# Patient Record
Sex: FEMALE | Race: WHITE | NOT HISPANIC OR LATINO | URBAN - METROPOLITAN AREA
[De-identification: names, ages, dates, MRNs, and addresses within clinical notes are randomized per-mention and may not be internally consistent; named-entity substitution may affect disease eponyms.]

---

## 2019-07-04 ENCOUNTER — EMERGENCY (EMERGENCY)
Facility: HOSPITAL | Age: 69
LOS: 1 days | Discharge: HOME | End: 2019-07-04
Attending: EMERGENCY MEDICINE
Payer: MEDICARE

## 2019-07-04 VITALS
HEART RATE: 78 BPM | DIASTOLIC BLOOD PRESSURE: 91 MMHG | RESPIRATION RATE: 18 BRPM | SYSTOLIC BLOOD PRESSURE: 150 MMHG | OXYGEN SATURATION: 100 %

## 2019-07-04 VITALS
RESPIRATION RATE: 20 BRPM | HEIGHT: 65 IN | HEART RATE: 79 BPM | WEIGHT: 126.1 LBS | DIASTOLIC BLOOD PRESSURE: 82 MMHG | TEMPERATURE: 98 F | SYSTOLIC BLOOD PRESSURE: 160 MMHG | OXYGEN SATURATION: 98 %

## 2019-07-04 LAB
ALBUMIN SERPL ELPH-MCNC: 4.2 G/DL — SIGNIFICANT CHANGE UP (ref 3.3–5)
ALP SERPL-CCNC: 52 U/L — SIGNIFICANT CHANGE UP (ref 40–120)
ALT FLD-CCNC: 12 U/L — SIGNIFICANT CHANGE UP (ref 10–45)
ANION GAP SERPL CALC-SCNC: 13 MMOL/L — SIGNIFICANT CHANGE UP (ref 5–17)
AST SERPL-CCNC: 14 U/L — SIGNIFICANT CHANGE UP (ref 10–40)
BASOPHILS # BLD AUTO: 0 K/UL — SIGNIFICANT CHANGE UP (ref 0–0.2)
BASOPHILS NFR BLD AUTO: 0.1 % — SIGNIFICANT CHANGE UP (ref 0–2)
BILIRUB SERPL-MCNC: 0.8 MG/DL — SIGNIFICANT CHANGE UP (ref 0.2–1.2)
BUN SERPL-MCNC: 14 MG/DL — SIGNIFICANT CHANGE UP (ref 7–23)
CALCIUM SERPL-MCNC: 9.6 MG/DL — SIGNIFICANT CHANGE UP (ref 8.4–10.5)
CHLORIDE SERPL-SCNC: 104 MMOL/L — SIGNIFICANT CHANGE UP (ref 96–108)
CO2 SERPL-SCNC: 25 MMOL/L — SIGNIFICANT CHANGE UP (ref 22–31)
CREAT SERPL-MCNC: 0.68 MG/DL — SIGNIFICANT CHANGE UP (ref 0.5–1.3)
EOSINOPHIL # BLD AUTO: 0.2 K/UL — SIGNIFICANT CHANGE UP (ref 0–0.5)
EOSINOPHIL NFR BLD AUTO: 2.7 % — SIGNIFICANT CHANGE UP (ref 0–6)
GLUCOSE SERPL-MCNC: 108 MG/DL — HIGH (ref 70–99)
HCT VFR BLD CALC: 41.1 % — SIGNIFICANT CHANGE UP (ref 34.5–45)
HGB BLD-MCNC: 14.3 G/DL — SIGNIFICANT CHANGE UP (ref 11.5–15.5)
LIDOCAIN IGE QN: 29 U/L — SIGNIFICANT CHANGE UP (ref 7–60)
LYMPHOCYTES # BLD AUTO: 2.9 K/UL — SIGNIFICANT CHANGE UP (ref 1–3.3)
LYMPHOCYTES # BLD AUTO: 40.4 % — SIGNIFICANT CHANGE UP (ref 13–44)
MCHC RBC-ENTMCNC: 30.8 PG — SIGNIFICANT CHANGE UP (ref 27–34)
MCHC RBC-ENTMCNC: 34.7 GM/DL — SIGNIFICANT CHANGE UP (ref 32–36)
MCV RBC AUTO: 88.6 FL — SIGNIFICANT CHANGE UP (ref 80–100)
MONOCYTES # BLD AUTO: 0.6 K/UL — SIGNIFICANT CHANGE UP (ref 0–0.9)
MONOCYTES NFR BLD AUTO: 8.2 % — SIGNIFICANT CHANGE UP (ref 2–14)
NEUTROPHILS # BLD AUTO: 3.5 K/UL — SIGNIFICANT CHANGE UP (ref 1.8–7.4)
NEUTROPHILS NFR BLD AUTO: 48.6 % — SIGNIFICANT CHANGE UP (ref 43–77)
PLATELET # BLD AUTO: 354 K/UL — SIGNIFICANT CHANGE UP (ref 150–400)
POTASSIUM SERPL-MCNC: 4.6 MMOL/L — SIGNIFICANT CHANGE UP (ref 3.5–5.3)
POTASSIUM SERPL-SCNC: 4.6 MMOL/L — SIGNIFICANT CHANGE UP (ref 3.5–5.3)
PROT SERPL-MCNC: 7.3 G/DL — SIGNIFICANT CHANGE UP (ref 6–8.3)
RBC # BLD: 4.64 M/UL — SIGNIFICANT CHANGE UP (ref 3.8–5.2)
RBC # FLD: 12.3 % — SIGNIFICANT CHANGE UP (ref 10.3–14.5)
SODIUM SERPL-SCNC: 142 MMOL/L — SIGNIFICANT CHANGE UP (ref 135–145)
TROPONIN T, HIGH SENSITIVITY RESULT: <6 NG/L — SIGNIFICANT CHANGE UP (ref 0–51)
WBC # BLD: 7.2 K/UL — SIGNIFICANT CHANGE UP (ref 3.8–10.5)
WBC # FLD AUTO: 7.2 K/UL — SIGNIFICANT CHANGE UP (ref 3.8–10.5)

## 2019-07-04 PROCEDURE — 84484 ASSAY OF TROPONIN QUANT: CPT

## 2019-07-04 PROCEDURE — 99284 EMERGENCY DEPT VISIT MOD MDM: CPT

## 2019-07-04 PROCEDURE — 99284 EMERGENCY DEPT VISIT MOD MDM: CPT | Mod: 25

## 2019-07-04 PROCEDURE — 76705 ECHO EXAM OF ABDOMEN: CPT | Mod: 26,RT

## 2019-07-04 PROCEDURE — 96374 THER/PROPH/DIAG INJ IV PUSH: CPT

## 2019-07-04 PROCEDURE — 85027 COMPLETE CBC AUTOMATED: CPT

## 2019-07-04 PROCEDURE — 83690 ASSAY OF LIPASE: CPT

## 2019-07-04 PROCEDURE — 80053 COMPREHEN METABOLIC PANEL: CPT

## 2019-07-04 PROCEDURE — 76705 ECHO EXAM OF ABDOMEN: CPT

## 2019-07-04 RX ORDER — FAMOTIDINE 10 MG/ML
1 INJECTION INTRAVENOUS
Qty: 30 | Refills: 0
Start: 2019-07-04 | End: 2019-07-18

## 2019-07-04 RX ORDER — FAMOTIDINE 10 MG/ML
20 INJECTION INTRAVENOUS ONCE
Refills: 0 | Status: COMPLETED | OUTPATIENT
Start: 2019-07-04 | End: 2019-07-04

## 2019-07-04 RX ORDER — SODIUM CHLORIDE 9 MG/ML
1000 INJECTION INTRAMUSCULAR; INTRAVENOUS; SUBCUTANEOUS ONCE
Refills: 0 | Status: COMPLETED | OUTPATIENT
Start: 2019-07-04 | End: 2019-07-04

## 2019-07-04 RX ORDER — ACETAMINOPHEN 500 MG
975 TABLET ORAL ONCE
Refills: 0 | Status: COMPLETED | OUTPATIENT
Start: 2019-07-04 | End: 2019-07-04

## 2019-07-04 RX ADMIN — Medication 975 MILLIGRAM(S): at 14:25

## 2019-07-04 RX ADMIN — SODIUM CHLORIDE 1000 MILLILITER(S): 9 INJECTION INTRAMUSCULAR; INTRAVENOUS; SUBCUTANEOUS at 14:25

## 2019-07-04 RX ADMIN — FAMOTIDINE 20 MILLIGRAM(S): 10 INJECTION INTRAVENOUS at 16:01

## 2019-07-04 NOTE — ED PROVIDER NOTE - PHYSICAL EXAMINATION
GEN: Well appearing, well nourished, in no apparent distress.  HEAD: NCAT  HEENT: PERRL, Airway patent, EOMI, non-erythematous pharynx, no exudates, uvula midline, MMM, neck supple, no LAD, no JVD  LUNG: CTAB, no adventitious sounds, no retractions, no nasal flaring  CV: RRR, no murmurs,   Abd: soft, RUQ TTP, +Lynch sign, no rebound or guarding, no masses palpated, BS+ in all quadrants, no CVAT  MSK: WWP, Pulses 2+ in extremities, No edema   Neuro:  AAOx3, Ambulatory with stable gait.  Skin: Warm and dry, no evidence of rash  Psych: normal mood and affect

## 2019-07-04 NOTE — ED PROVIDER NOTE - CLINICAL SUMMARY MEDICAL DECISION MAKING FREE TEXT BOX
ruq pain and vomiting in pt with hx of cholelithiasis, r/o cholecystitis with ruq sono, basic labs, lipase to r/o pancreatitis. also considering gastitis, vs gastroenteritis, vs gerd. no cvat or urinary symptoms so low concern for nephrolithiasis. ruq pain and vomiting in pt with hx of cholelithiasis, r/o cholecystitis with ruq sono, basic labs, lipase to r/o pancreatitis. also considering gastitis, vs gastroenteritis, vs gerd. no cvat or urinary symptoms so low concern for nephrolithiasis.    EVAN Pham MD: Pt is a 69 y/o female with PMH of hx cholelithiasis presents with 3 days of RUQ pain. States she is visiting from out of town to visit a sick relative. Was on a plane 3 nights ago, had severe upper abd pain, N/V. Today, has had 8/10, non-radiating, sharp, intermittent, upper abd pain, worse with eating.  Patient denies chest pain, SOB, f/c, cough, current N/V/D/C, weakness, HA, dizziness, urinary symptoms, extremity pain or swelling. Exam: A & O x 3, NAD, HEENT WNL and no facial asymmetry; lungs CTAB, cor +S1/S2, RRR, no murmurs; abdomen soft NTND, neg bender's sign, neg mcburneys point ttp; extremities with no edema; skin with no rashes, neuro exam non focal with no motor or sensory deficits. Note: my exam was performed after the resident exam, at which pont, patient's pain had completely resolved, and her abdomen was soft NTND. DDx: cholelithiasis, cholecystitis, PUD, gastritis, ACS, MSK pain, other. Plan: basic labs, ekg, trop, RUQ US, reeval

## 2019-07-04 NOTE — ED PROVIDER NOTE - NS ED ROS FT
Constitutional: no fevers, no chills.  Eyes: no visual changes.  Ears: no ear drainage, no ear pain.  Nose: no nasal congestion.  Mouth/Throat: no sore throat.  Cardiovascular: no chest pain.  Respiratory: no shortness of breath, no wheezing, no cough  Gastrointestinal: no nausea, +vomiting, no diarrhea, + RUQ abdominal pain.  MSK: no flank pain, no back pain.  Genitourinary: no dysuria, no hematuria.  Skin: no rashes.  Neuro: no headache,   Psychiatric: no known mental health issues.

## 2019-07-04 NOTE — ED PROVIDER NOTE - NSFOLLOWUPCLINICS_GEN_ALL_ED_FT
[facility]  Gastroenterology  [address] [place] [id number]  Phone: [phone]  Fax:   Follow Up Time:

## 2019-07-04 NOTE — ED ADULT NURSE NOTE - OBJECTIVE STATEMENT
pt 69yo female presents with abd pain vomiting x 2 days pt known gallbladder hx pt denies fever or chills alert oriented states pain 4 out of 10 on arrival skin warm dry denies dysuria pt examined by md with labs sent and ultrasound posted and pending

## 2019-07-04 NOTE — ED PROVIDER NOTE - NSFOLLOWUPINSTRUCTIONS_ED_ALL_ED_FT
1) Follow up with your doctor in 1-2 days for reassessment  2) Return to the ER immediately for new or worsening symptoms including uncontrolled pain, intractable vomiting, or fever > 100.4.  3) Please take Pepcid for abdominal pains as prescribed.   4) Take tylenol 650 mg by mouth every 8 hours as needed for pain.  5) Follow up with the Gastroenterology group listed above or your own Gastroenterology if you go back to [place] before can make an appointment here for possible Endoscopy.

## 2019-07-06 ENCOUNTER — INPATIENT (INPATIENT)
Facility: HOSPITAL | Age: 69
LOS: 2 days | Discharge: HOME | DRG: 419 | End: 2019-07-09
Attending: SURGERY | Admitting: SURGERY
Payer: MEDICARE

## 2019-07-06 VITALS
HEART RATE: 70 BPM | DIASTOLIC BLOOD PRESSURE: 84 MMHG | OXYGEN SATURATION: 100 % | TEMPERATURE: 97 F | RESPIRATION RATE: 14 BRPM | WEIGHT: 126.1 LBS | SYSTOLIC BLOOD PRESSURE: 132 MMHG

## 2019-07-06 DIAGNOSIS — K81.9 CHOLECYSTITIS, UNSPECIFIED: ICD-10-CM

## 2019-07-06 DIAGNOSIS — Z98.891 HISTORY OF UTERINE SCAR FROM PREVIOUS SURGERY: Chronic | ICD-10-CM

## 2019-07-06 PROBLEM — K80.20 CALCULUS OF GALLBLADDER WITHOUT CHOLECYSTITIS WITHOUT OBSTRUCTION: Chronic | Status: ACTIVE | Noted: 2019-07-04

## 2019-07-06 LAB
ALBUMIN SERPL ELPH-MCNC: 4.2 G/DL — SIGNIFICANT CHANGE UP (ref 3.3–5)
ALP SERPL-CCNC: 66 U/L — SIGNIFICANT CHANGE UP (ref 40–120)
ALT FLD-CCNC: 24 U/L — SIGNIFICANT CHANGE UP (ref 12–78)
ANION GAP SERPL CALC-SCNC: 7 MMOL/L — SIGNIFICANT CHANGE UP (ref 5–17)
AST SERPL-CCNC: 15 U/L — SIGNIFICANT CHANGE UP (ref 15–37)
BASOPHILS # BLD AUTO: 0.03 K/UL — SIGNIFICANT CHANGE UP (ref 0–0.2)
BASOPHILS NFR BLD AUTO: 0.4 % — SIGNIFICANT CHANGE UP (ref 0–2)
BILIRUB SERPL-MCNC: 1.1 MG/DL — SIGNIFICANT CHANGE UP (ref 0.2–1.2)
BLD GP AB SCN SERPL QL: SIGNIFICANT CHANGE UP
BUN SERPL-MCNC: 14 MG/DL — SIGNIFICANT CHANGE UP (ref 7–23)
CALCIUM SERPL-MCNC: 9.8 MG/DL — SIGNIFICANT CHANGE UP (ref 8.5–10.1)
CHLORIDE SERPL-SCNC: 102 MMOL/L — SIGNIFICANT CHANGE UP (ref 96–108)
CO2 SERPL-SCNC: 27 MMOL/L — SIGNIFICANT CHANGE UP (ref 22–31)
CREAT SERPL-MCNC: 0.81 MG/DL — SIGNIFICANT CHANGE UP (ref 0.5–1.3)
EOSINOPHIL # BLD AUTO: 0.01 K/UL — SIGNIFICANT CHANGE UP (ref 0–0.5)
EOSINOPHIL NFR BLD AUTO: 0.1 % — SIGNIFICANT CHANGE UP (ref 0–6)
GLUCOSE SERPL-MCNC: 122 MG/DL — HIGH (ref 70–99)
HCT VFR BLD CALC: 43.5 % — SIGNIFICANT CHANGE UP (ref 34.5–45)
HGB BLD-MCNC: 15.1 G/DL — SIGNIFICANT CHANGE UP (ref 11.5–15.5)
IMM GRANULOCYTES NFR BLD AUTO: 0.4 % — SIGNIFICANT CHANGE UP (ref 0–1.5)
LIDOCAIN IGE QN: 110 U/L — SIGNIFICANT CHANGE UP (ref 73–393)
LYMPHOCYTES # BLD AUTO: 1.59 K/UL — SIGNIFICANT CHANGE UP (ref 1–3.3)
LYMPHOCYTES # BLD AUTO: 19.2 % — SIGNIFICANT CHANGE UP (ref 13–44)
MCHC RBC-ENTMCNC: 29.4 PG — SIGNIFICANT CHANGE UP (ref 27–34)
MCHC RBC-ENTMCNC: 34.7 GM/DL — SIGNIFICANT CHANGE UP (ref 32–36)
MCV RBC AUTO: 84.8 FL — SIGNIFICANT CHANGE UP (ref 80–100)
MONOCYTES # BLD AUTO: 0.39 K/UL — SIGNIFICANT CHANGE UP (ref 0–0.9)
MONOCYTES NFR BLD AUTO: 4.7 % — SIGNIFICANT CHANGE UP (ref 2–14)
NEUTROPHILS # BLD AUTO: 6.23 K/UL — SIGNIFICANT CHANGE UP (ref 1.8–7.4)
NEUTROPHILS NFR BLD AUTO: 75.2 % — SIGNIFICANT CHANGE UP (ref 43–77)
NRBC # BLD: 0 /100 WBCS — SIGNIFICANT CHANGE UP (ref 0–0)
PLATELET # BLD AUTO: 387 K/UL — SIGNIFICANT CHANGE UP (ref 150–400)
POTASSIUM SERPL-MCNC: 4.2 MMOL/L — SIGNIFICANT CHANGE UP (ref 3.5–5.3)
POTASSIUM SERPL-SCNC: 4.2 MMOL/L — SIGNIFICANT CHANGE UP (ref 3.5–5.3)
PROT SERPL-MCNC: 8.5 G/DL — HIGH (ref 6–8.3)
RBC # BLD: 5.13 M/UL — SIGNIFICANT CHANGE UP (ref 3.8–5.2)
RBC # FLD: 12.6 % — SIGNIFICANT CHANGE UP (ref 10.3–14.5)
SODIUM SERPL-SCNC: 136 MMOL/L — SIGNIFICANT CHANGE UP (ref 135–145)
WBC # BLD: 8.28 K/UL — SIGNIFICANT CHANGE UP (ref 3.8–10.5)
WBC # FLD AUTO: 8.28 K/UL — SIGNIFICANT CHANGE UP (ref 3.8–10.5)

## 2019-07-06 PROCEDURE — 93010 ELECTROCARDIOGRAM REPORT: CPT

## 2019-07-06 PROCEDURE — 99285 EMERGENCY DEPT VISIT HI MDM: CPT

## 2019-07-06 PROCEDURE — 76705 ECHO EXAM OF ABDOMEN: CPT | Mod: 26

## 2019-07-06 PROCEDURE — 47562 LAPAROSCOPIC CHOLECYSTECTOMY: CPT | Mod: AS

## 2019-07-06 RX ORDER — SODIUM CHLORIDE 9 MG/ML
1000 INJECTION, SOLUTION INTRAVENOUS
Refills: 0 | Status: DISCONTINUED | OUTPATIENT
Start: 2019-07-06 | End: 2019-07-08

## 2019-07-06 RX ORDER — PANTOPRAZOLE SODIUM 20 MG/1
40 TABLET, DELAYED RELEASE ORAL DAILY
Refills: 0 | Status: DISCONTINUED | OUTPATIENT
Start: 2019-07-06 | End: 2019-07-08

## 2019-07-06 RX ORDER — SODIUM CHLORIDE 9 MG/ML
3 INJECTION INTRAMUSCULAR; INTRAVENOUS; SUBCUTANEOUS ONCE
Refills: 0 | Status: COMPLETED | OUTPATIENT
Start: 2019-07-06 | End: 2019-07-06

## 2019-07-06 RX ORDER — PANTOPRAZOLE SODIUM 20 MG/1
40 TABLET, DELAYED RELEASE ORAL ONCE
Refills: 0 | Status: COMPLETED | OUTPATIENT
Start: 2019-07-06 | End: 2019-07-06

## 2019-07-06 RX ORDER — ENOXAPARIN SODIUM 100 MG/ML
40 INJECTION SUBCUTANEOUS DAILY
Refills: 0 | Status: DISCONTINUED | OUTPATIENT
Start: 2019-07-07 | End: 2019-07-07

## 2019-07-06 RX ORDER — SODIUM CHLORIDE 9 MG/ML
1000 INJECTION INTRAMUSCULAR; INTRAVENOUS; SUBCUTANEOUS ONCE
Refills: 0 | Status: COMPLETED | OUTPATIENT
Start: 2019-07-06 | End: 2019-07-06

## 2019-07-06 RX ORDER — ONDANSETRON 8 MG/1
4 TABLET, FILM COATED ORAL ONCE
Refills: 0 | Status: COMPLETED | OUTPATIENT
Start: 2019-07-06 | End: 2019-07-06

## 2019-07-06 RX ORDER — PIPERACILLIN AND TAZOBACTAM 4; .5 G/20ML; G/20ML
3.38 INJECTION, POWDER, LYOPHILIZED, FOR SOLUTION INTRAVENOUS EVERY 8 HOURS
Refills: 0 | Status: COMPLETED | OUTPATIENT
Start: 2019-07-06 | End: 2019-07-07

## 2019-07-06 RX ORDER — KETOROLAC TROMETHAMINE 30 MG/ML
30 SYRINGE (ML) INJECTION ONCE
Refills: 0 | Status: DISCONTINUED | OUTPATIENT
Start: 2019-07-06 | End: 2019-07-06

## 2019-07-06 RX ORDER — PIPERACILLIN AND TAZOBACTAM 4; .5 G/20ML; G/20ML
3.38 INJECTION, POWDER, LYOPHILIZED, FOR SOLUTION INTRAVENOUS ONCE
Refills: 0 | Status: COMPLETED | OUTPATIENT
Start: 2019-07-06 | End: 2019-07-06

## 2019-07-06 RX ORDER — MORPHINE SULFATE 50 MG/1
4 CAPSULE, EXTENDED RELEASE ORAL EVERY 4 HOURS
Refills: 0 | Status: DISCONTINUED | OUTPATIENT
Start: 2019-07-06 | End: 2019-07-08

## 2019-07-06 RX ADMIN — SODIUM CHLORIDE 3 MILLILITER(S): 9 INJECTION INTRAMUSCULAR; INTRAVENOUS; SUBCUTANEOUS at 10:50

## 2019-07-06 RX ADMIN — MORPHINE SULFATE 4 MILLIGRAM(S): 50 CAPSULE, EXTENDED RELEASE ORAL at 20:15

## 2019-07-06 RX ADMIN — ONDANSETRON 4 MILLIGRAM(S): 8 TABLET, FILM COATED ORAL at 10:49

## 2019-07-06 RX ADMIN — PIPERACILLIN AND TAZOBACTAM 200 GRAM(S): 4; .5 INJECTION, POWDER, LYOPHILIZED, FOR SOLUTION INTRAVENOUS at 15:29

## 2019-07-06 RX ADMIN — Medication 30 MILLIGRAM(S): at 11:20

## 2019-07-06 RX ADMIN — MORPHINE SULFATE 4 MILLIGRAM(S): 50 CAPSULE, EXTENDED RELEASE ORAL at 19:36

## 2019-07-06 RX ADMIN — SODIUM CHLORIDE 1000 MILLILITER(S): 9 INJECTION INTRAMUSCULAR; INTRAVENOUS; SUBCUTANEOUS at 10:49

## 2019-07-06 RX ADMIN — Medication 30 MILLIGRAM(S): at 10:50

## 2019-07-06 RX ADMIN — SODIUM CHLORIDE 75 MILLILITER(S): 9 INJECTION, SOLUTION INTRAVENOUS at 17:16

## 2019-07-06 RX ADMIN — SODIUM CHLORIDE 1000 MILLILITER(S): 9 INJECTION INTRAMUSCULAR; INTRAVENOUS; SUBCUTANEOUS at 11:30

## 2019-07-06 RX ADMIN — PIPERACILLIN AND TAZOBACTAM 25 GRAM(S): 4; .5 INJECTION, POWDER, LYOPHILIZED, FOR SOLUTION INTRAVENOUS at 21:07

## 2019-07-06 RX ADMIN — PANTOPRAZOLE SODIUM 40 MILLIGRAM(S): 20 TABLET, DELAYED RELEASE ORAL at 10:49

## 2019-07-06 NOTE — H&P ADULT - HISTORY OF PRESENT ILLNESS
68 year old white female who presents c/o severe RUQ pain.  Pt states she has had a long history of gallbladder attacks.  She was in her usual state of good health until five days ago when she developed severe RUQ pain. Her pain was sharp with radiation to her back.  She had multiple episodes of vomiting.  She was seen an [place] [facility] and discharged.  She continued with severe nausea and vomiting, and pain.  She denies fevers, chills and night sweats.  Denies jaundice,

## 2019-07-06 NOTE — ED ADULT NURSE NOTE - OBJECTIVE STATEMENT
patient comes to ED for evaluation of epigastric abd pain for several days was seen yesterday at [facility] was told to follow up for an endoscopy states pain persists with nausea and vomiting was sent to meet Dr. Buchanan

## 2019-07-06 NOTE — H&P ADULT - NSHPLABSRESULTS_GEN_ALL_CORE
< from: US Gallbladder (07.06.19 @ 12:11) >        PROCEDURE DATE:  07/06/2019          INTERPRETATION:  Exam Date: 7/6/2019 12:11 PM  Clinical Information: Right upper quadrant pain, vomiting  Technique: Gallbladder ultrasound was performed with comparison to   ultrasound of 7/4/2019    Findings:    Large gallstones and gallbladder sludge completely filling the   gallbladder as seen on recent ultrasound of 7/4/2019. Suggestion mild   gallbladder wall thickening. No definite pericholecystic fluid  identified. Common bile duct Is of normal caliber.    Impression:    Gallstones and gallbladder sludge completely filling the gallbladder as   seen on 7/4/2019    < end of copied text >

## 2019-07-06 NOTE — ED ADULT NURSE NOTE - CHPI ED NUR SYMPTOMS NEG
no burning urination/no blood in stool/no hematuria/no abdominal distension/no diarrhea/no dysuria/no fever/no chills

## 2019-07-06 NOTE — ED ADULT TRIAGE NOTE - NS ED NOTE AC HIGH RISK COUNTRIES
No
Pt is I c good balance and endurance in all functional ability, therefore d/c pt from the PT program.  If any further needs arise, please reconsult.
predicted duration of therapy intervention/risk reduction/prevention/functional limitations in following categories/therapy frequency/home with home PT/impairments found

## 2019-07-06 NOTE — ED PROVIDER NOTE - OBJECTIVE STATEMENT
Pt is a 67 yo female who presents to the ED with a cc of abdominal pain.  PMHx of cholelithiasis.  Pt reports that symptoms began 2 days ago.  She reports that she developed severe upper abdominal pain worse in the RUQ and epigastric region.  She further reports associated nausea and vomiting.  She was seen at OSH and was told that her gallbladder was likely the cause but that there was no evidence of infection so she needed to follow up outpatient.  She reports that she has an appt Monday but last night symptoms worsened again and she has been unable to tolerate po.  Pt reports that she has not taken anything for pain.  Denies fever, chills, D/C, CP, SOB, ext numbness or weakness

## 2019-07-07 LAB
ALBUMIN SERPL ELPH-MCNC: 3 G/DL — LOW (ref 3.3–5)
ALP SERPL-CCNC: 49 U/L — SIGNIFICANT CHANGE UP (ref 40–120)
ALT FLD-CCNC: 21 U/L — SIGNIFICANT CHANGE UP (ref 12–78)
AMYLASE P1 CFR SERPL: 32 U/L — SIGNIFICANT CHANGE UP (ref 25–115)
ANION GAP SERPL CALC-SCNC: 7 MMOL/L — SIGNIFICANT CHANGE UP (ref 5–17)
AST SERPL-CCNC: 13 U/L — LOW (ref 15–37)
BILIRUB SERPL-MCNC: 1.4 MG/DL — HIGH (ref 0.2–1.2)
BUN SERPL-MCNC: 13 MG/DL — SIGNIFICANT CHANGE UP (ref 7–23)
CALCIUM SERPL-MCNC: 8.3 MG/DL — LOW (ref 8.5–10.1)
CHLORIDE SERPL-SCNC: 107 MMOL/L — SIGNIFICANT CHANGE UP (ref 96–108)
CO2 SERPL-SCNC: 27 MMOL/L — SIGNIFICANT CHANGE UP (ref 22–31)
CREAT SERPL-MCNC: 0.78 MG/DL — SIGNIFICANT CHANGE UP (ref 0.5–1.3)
GLUCOSE SERPL-MCNC: 100 MG/DL — HIGH (ref 70–99)
HCT VFR BLD CALC: 35.7 % — SIGNIFICANT CHANGE UP (ref 34.5–45)
HCV AB S/CO SERPL IA: 0.11 S/CO — SIGNIFICANT CHANGE UP (ref 0–0.99)
HCV AB SERPL-IMP: SIGNIFICANT CHANGE UP
HGB BLD-MCNC: 12.3 G/DL — SIGNIFICANT CHANGE UP (ref 11.5–15.5)
INR BLD: 1.06 RATIO — SIGNIFICANT CHANGE UP (ref 0.88–1.16)
MCHC RBC-ENTMCNC: 29.6 PG — SIGNIFICANT CHANGE UP (ref 27–34)
MCHC RBC-ENTMCNC: 34.5 GM/DL — SIGNIFICANT CHANGE UP (ref 32–36)
MCV RBC AUTO: 86 FL — SIGNIFICANT CHANGE UP (ref 80–100)
NRBC # BLD: 0 /100 WBCS — SIGNIFICANT CHANGE UP (ref 0–0)
PLATELET # BLD AUTO: 301 K/UL — SIGNIFICANT CHANGE UP (ref 150–400)
POTASSIUM SERPL-MCNC: 3.9 MMOL/L — SIGNIFICANT CHANGE UP (ref 3.5–5.3)
POTASSIUM SERPL-SCNC: 3.9 MMOL/L — SIGNIFICANT CHANGE UP (ref 3.5–5.3)
PROT SERPL-MCNC: 6.2 G/DL — SIGNIFICANT CHANGE UP (ref 6–8.3)
PROTHROM AB SERPL-ACNC: 12.1 SEC — SIGNIFICANT CHANGE UP (ref 10–12.9)
RBC # BLD: 4.15 M/UL — SIGNIFICANT CHANGE UP (ref 3.8–5.2)
RBC # FLD: 12.9 % — SIGNIFICANT CHANGE UP (ref 10.3–14.5)
SODIUM SERPL-SCNC: 141 MMOL/L — SIGNIFICANT CHANGE UP (ref 135–145)
WBC # BLD: 5.22 K/UL — SIGNIFICANT CHANGE UP (ref 3.8–10.5)
WBC # FLD AUTO: 5.22 K/UL — SIGNIFICANT CHANGE UP (ref 3.8–10.5)

## 2019-07-07 PROCEDURE — 99222 1ST HOSP IP/OBS MODERATE 55: CPT

## 2019-07-07 RX ADMIN — MORPHINE SULFATE 4 MILLIGRAM(S): 50 CAPSULE, EXTENDED RELEASE ORAL at 21:00

## 2019-07-07 RX ADMIN — PIPERACILLIN AND TAZOBACTAM 25 GRAM(S): 4; .5 INJECTION, POWDER, LYOPHILIZED, FOR SOLUTION INTRAVENOUS at 13:24

## 2019-07-07 RX ADMIN — PIPERACILLIN AND TAZOBACTAM 25 GRAM(S): 4; .5 INJECTION, POWDER, LYOPHILIZED, FOR SOLUTION INTRAVENOUS at 05:11

## 2019-07-07 RX ADMIN — SODIUM CHLORIDE 75 MILLILITER(S): 9 INJECTION, SOLUTION INTRAVENOUS at 17:14

## 2019-07-07 RX ADMIN — PANTOPRAZOLE SODIUM 40 MILLIGRAM(S): 20 TABLET, DELAYED RELEASE ORAL at 12:14

## 2019-07-07 RX ADMIN — MORPHINE SULFATE 4 MILLIGRAM(S): 50 CAPSULE, EXTENDED RELEASE ORAL at 20:02

## 2019-07-07 NOTE — CONSULT NOTE ADULT - SUBJECTIVE AND OBJECTIVE BOX
HPI:  68 year old white female who presents c/o severe RUQ pain.  Pt states she has had a long history of gallbladder attacks.  She was in her usual state of good health until five days ago when she developed severe RUQ pain. Her pain was sharp with radiation to her back.  She had multiple episodes of vomiting.  She was seen an [place] [facility] and discharged.  She continued with severe nausea and vomiting, and pain.  She denies fevers, chills and night sweats.  Denies jaundice, (2019 14:20)      PAST MEDICAL & SURGICAL HISTORY:  Cholelithiasis  S/P  section      REVIEW OF SYSTEMS:    CONSTITUTIONAL: No fever, no weight loss, no fatigue  EYES: No eye pain, no visual disturbances  ENMT:  No difficulty hearing, no tinnitus, no vertigo; No sinus or throat pain  NECK: No pain or stiffness  RESPIRATORY: No cough, no wheezing, no chills, no hemoptysis; No shortness of breath  CARDIOVASCULAR: No chest pain, palpitations, dizziness, or leg swelling  GASTROINTESTINAL: + epigastric pain. No nausea, no vomiting, no hematemesis; No diarrhea, no constipation. No melena, no hematochezia.  GENITOURINARY: No dysuria, no frequency, no hematuria, no incontinence  NEUROLOGICAL: No headaches, no memory loss, no loss of strength, no numbness, no tremors  SKIN: No itching, no burning, no rashes   LYMPH NODES: No enlarged glands  ENDOCRINE: No heat, no cold intolerance; No hair loss  MUSCULOSKELETAL: No joint pain, no swelling; No muscle pain, no back pain, no extremity pain  PSYCHIATRIC: No depression, no anxiety, no mood swings, no difficulty sleeping  HEME/LYMPH: No easy bruising, no bleeding gums  ALLERGY AND IMMUNOLOGIC: No hives, no eczema      MEDICATIONS  (STANDING):  enoxaparin Injectable 40 milliGRAM(s) SubCutaneous daily  lactated ringers. 1000 milliLiter(s) (75 mL/Hr) IV Continuous <Continuous>  pantoprazole  Injectable 40 milliGRAM(s) IV Push daily  piperacillin/tazobactam IVPB.. 3.375 Gram(s) IV Intermittent every 8 hours    MEDICATIONS  (PRN):  morphine  - Injectable 4 milliGRAM(s) IV Push every 4 hours PRN Moderate Pain (4 - 6)      Allergies    No Known Allergies    Intolerances        SOCIAL HISTORY: quit smoking 20 years ago, 1-2 glasses wine with dinner (not every night)    FAMILY HISTORY: No pertinent family history in first degree relatives       Vital Signs Last 24 Hrs  T(C): 36.6 (2019 08:22), Max: 37.2 (2019 17:47)  T(F): 97.9 (2019 08:22), Max: 99 (2019 17:47)  HR: 70 (2019 08:22) (67 - 74)  BP: 128/78 (2019 08:22) (108/68 - 130/83)  BP(mean): --  RR: 19 (2019 08:22) (15 - 19)  SpO2: 98% (2019 08:22) (95% - 98%)    PHYSICAL EXAM:    GENERAL: NAD  HEAD:  Atraumatic, Normocephalic  EYES: EOMI, PERRLA, conjunctiva and sclera clear  ENMT: No tonsillar erythema, exudates, or enlargement; Moist mucous membranes  NECK: Supple, No JVD, Normal thyroid  NERVOUS SYSTEM:  Alert & Oriented X3, Motor Strength 5/5 B/L upper and lower extremities;  CHEST/LUNG: Clear to percussion bilaterally; No rales, rhonchi, wheezing, or rubs  HEART: Regular rate and rhythm; No murmurs, rubs, or gallops  ABDOMEN: Soft, mild epigastric tenderness, Nondistended; Bowel sounds present  EXTREMITIES:  2+ Peripheral Pulses, No clubbing, cyanosis, or edema  LYMPH: No lymphadenopathy   SKIN: No rashes or lesions      LABS:                        12.3   5.22  )-----------( 301      ( 2019 09:51 )             35.7         141  |  107  |  13  ----------------------------<  100<H>  3.9   |  27  |  0.78    Ca    8.3<L>      2019 09:51    TPro  6.2  /  Alb  3.0<L>  /  TBili  1.4<H>  /  DBili  x   /  AST  13<L>  /  ALT  21  /  AlkPhos  49      PT/INR - ( 2019 09:51 )   PT: 12.1 sec;   INR: 1.06 ratio               RADIOLOGY & ADDITIONAL STUDIES:    < from: US Gallbladder (19 @ 12:11) >    EXAM:  US GALLBLADDER                            PROCEDURE DATE:  2019          INTERPRETATION:  Exam Date: 2019 12:11 PM  Clinical Information: Right upper quadrant pain, vomiting  Technique: Gallbladder ultrasound was performed with comparison to   ultrasound of 2019    Findings:    Large gallstones and gallbladder sludge completely filling the   gallbladder as seen on recent ultrasound of 2019. Suggestion mild   gallbladder wall thickening. No definite pericholecystic fluid  identified. Common bile duct Is of normal caliber.    Impression:    Gallstones and gallbladder sludge completely filling the gallbladder as   seen on 2019                ELIGIO DOMINGUEZ M.D., ATTENDING RADIOLOGIST  This document has been electronically signed. 2019 12:14PM                < end of copied text >      < from: 12 Lead ECG (19 @ 14:13) >    Ventricular Rate 78 BPM    Atrial Rate 78 BPM    P-R Interval 142 ms    QRS Duration 96 ms    Q-T Interval 418 ms    QTC Calculation(Bezet) 476 ms    P Axis 38 degrees    R Axis -40 degrees    T Axis 48 degrees    Diagnosis Line Normal sinus rhythm  Left axis deviation  Incomplete right bundle branch block    Confirmed by RANJANA LOPEZ ([id number]) on 2019 8:18:20 AM    < end of copied text >
HPI:  68 year old white female PMH cholelithiasis ,HLD (not on medications) who presents c/o severe RUQ pain.  Pt states she has had a long history of gallbladder attacks.  She was in her usual state of good health until five days ago when she developed severe RUQ pain. Her pain was sharp with radiation to her back.  She had multiple episodes of vomiting.  She was seen an [place] ER and discharged.  She continued with severe nausea and vomiting, and pain.  She denies fevers, chills and night sweats.  Denies jaundice, (2019 14:20)        PMH:   Cholelithiasis    PSH:   S/P  section  No significant past surgical history    Medications:   enoxaparin Injectable 40 milliGRAM(s) SubCutaneous daily  lactated ringers. 1000 milliLiter(s) IV Continuous <Continuous>  morphine  - Injectable 4 milliGRAM(s) IV Push every 4 hours PRN  pantoprazole  Injectable 40 milliGRAM(s) IV Push daily  piperacillin/tazobactam IVPB.. 3.375 Gram(s) IV Intermittent every 8 hours    Allergies:  No Known Allergies    FAMILY HISTORY:  Mother: CABG 50's triple vessel disease           Social History:  Smoking: Quit smoking 24yrs ago  Alcohol: social drinker 1-2 glasses of wine on occasions   Drugs: N/A    Review of Systems:  Constitutional: [ ] Fever [ ] Chills [ ] Fatigue [ ] Weight change   HEENT: [ ] Blurred vision [ ] Eye Pain [ ] Headache [ ] Runny nose [ ] Sore Throat   Respiratory: [ ] Cough [ ] Wheezing [ ] Shortness of breath  Cardiovascular: [ ] Chest Pain [ ] Palpitations [ ] SABA [ ] PND [ ] Orthopnea  Gastrointestinal: +Abdominal Pain +Nausea, +Vomiting  Genitourinary: [ ] Nocturia [ ] Dysuria [ ] Incontinence  Extremities: [ ] Swelling [ ] Joint Pain  Neurologic: [ ] Focal deficit [ ] Paresthesias [ ] Syncope  Lymphatic: [ ] Swelling [ ] Lymphadenopathy   Skin: [ ] Rash [ ] Ecchymoses [ ] Wounds [ ] Lesions  Psychiatry: [ ] Depression [ ] Suicidal/Homicidal Ideation [ ] Anxiety [ ] Sleep Disturbances  [X ] 10 point review of systems is otherwise negative except as mentioned above            [ ]Unable to obtain    Physical Exam:  T(C): 36.6 (19 @ 08:22), Max: 37.2 (19 @ 17:47)  HR: 70 (19 @ 08:22) (67 - 74)  BP: 128/78 (19 @ 08:22) (108/68 - 130/83)  RR: 19 (19 @ 08:22) (15 - 19)  SpO2: 98% (19 @ 08:22) (95% - 98%)  Wt(kg): --     @ 07:01  -   @ 07:00  --------------------------------------------------------  IN: 825 mL / OUT: 0 mL / NET: 825 mL        Appearance: NAD  Cardiovascular: S1, S2  Respiratory: Clear to auscultation bilaterally  Gastrointestinal: Soft, +tender, non-distended +BS   Psychiatry: AxAOx3, mood & affect appropriate      Cardiovascular Diagnostic Testing:    Interpretation of Telemetry: Pt. is not on telemetry    ECG: NSR @ 78 BPM incomplete RBBB no acute changes noted    Echo:    Stress Testing:    Cath:    Imaging:  < from: US Gallbladder (19 @ 12:11) >  Findings:    Large gallstones and gallbladder sludge completely filling the   gallbladder as seen on recent ultrasound of 2019. Suggestion mild   gallbladder wall thickening. No definite pericholecystic fluid  identified. Common bile duct Is of normal caliber.    Impression:    Gallstones and gallbladder sludge completely filling the gallbladder as   seen on 2019    Labs:                        12.3   5.22  )-----------( 301      ( 2019 09:51 )             35.7         141  |  107  |  13  ----------------------------<  100<H>  3.9   |  27  |  0.78    Ca    8.3<L>      2019 09:51    TPro  6.2  /  Alb  3.0<L>  /  TBili  1.4<H>  /  DBili  x   /  AST  13<L>  /  ALT  21  /  AlkPhos  49      PT/INR - ( 2019 09:51 )   PT: 12.1 sec;   INR: 1.06 ratio

## 2019-07-07 NOTE — CONSULT NOTE ADULT - ASSESSMENT
69 F comes with biliary colic -- scheduled for lap mallory in AM    May proceed to OR for cholecystectomy  Will get cardiology consult b/c of abnormal EKG (Dr Sage called)

## 2019-07-07 NOTE — CONSULT NOTE ADULT - ASSESSMENT
68 year old white female PMH cholelithiasis ,HLD (not on medications) who presents with  c/o severe RUQ pain, with biliary colic. Pt. is scheduled for a lap mallory in the AM. 68 year old white female PMH cholelithiasis ,HLD (not on medications) who presents with  c/o severe RUQ pain, with biliary colic. Pt. is scheduled for a lap mallory in the AM.      - Optimized for the OR from a cardiac point of view with no evidence of active ischemic heart disease, decompensated heart failure, severe obstructive valvular disease, or uncontrolled arrhythmia.  - Monitor and replete lytes  - Pain control  - To follow

## 2019-07-07 NOTE — PROGRESS NOTE ADULT - ASSESSMENT
IMPRESSING acute cholecystitis                      elevated bilirubin  PLAN recheck bili in am           for lap mallory tomorrow

## 2019-07-07 NOTE — PROGRESS NOTE ADULT - SUBJECTIVE AND OBJECTIVE BOX
Subjective: pt with some RUQ pain.    Objective:  Vital Signs Last 24 Hrs  T(C): 36.6 (07 Jul 2019 08:22), Max: 37.2 (06 Jul 2019 17:47)  T(F): 97.9 (07 Jul 2019 08:22), Max: 99 (06 Jul 2019 17:47)  HR: 70 (07 Jul 2019 08:22) (67 - 74)  BP: 128/78 (07 Jul 2019 08:22) (108/68 - 130/83)  BP(mean): --  RR: 19 (07 Jul 2019 08:22) (15 - 19)  SpO2: 98% (07 Jul 2019 08:22) (95% - 98%)    Heent: BALDOMERO, DINESHOM  Pul: clear  Cor: RSR, without murmurs  Abdomen: normal bowel sounds, with RUQ tenderness without guarding or rebound,  Extremities: without edema, motor/sensory intact, without calf pain, Homans sign negative.                        12.3   5.22  )-----------( 301      ( 07 Jul 2019 09:51 )             35.7       07-07    141  |  107  |  13  ----------------------------<  100<H>  3.9   |  27  |  0.78    Ca    8.3<L>      07 Jul 2019 09:51    TPro  6.2  /  Alb  3.0<L>  /  TBili  1.4<H>  /  DBili  x   /  AST  13<L>  /  ALT  21  /  AlkPhos  49  07-07 07-06 @ 07:01 - 07-07 @ 07:00  --------------------------------------------------------  IN:    lactated ringers.: 825 mL  Total IN: 825 mL    OUT:  Total OUT: 0 mL    Total NET: 825 mL      07-07 @ 07:01  -  07-07 @ 14:51  --------------------------------------------------------  IN:    IV PiggyBack: 100 mL  Total IN: 100 mL    OUT:  Total OUT: 0 mL    Total NET: 100 mL

## 2019-07-08 DIAGNOSIS — K81.9 CHOLECYSTITIS, UNSPECIFIED: ICD-10-CM

## 2019-07-08 LAB
ALBUMIN SERPL ELPH-MCNC: 3.3 G/DL — SIGNIFICANT CHANGE UP (ref 3.3–5)
ALP SERPL-CCNC: 51 U/L — SIGNIFICANT CHANGE UP (ref 40–120)
ALT FLD-CCNC: 21 U/L — SIGNIFICANT CHANGE UP (ref 12–78)
ANION GAP SERPL CALC-SCNC: 7 MMOL/L — SIGNIFICANT CHANGE UP (ref 5–17)
AST SERPL-CCNC: 17 U/L — SIGNIFICANT CHANGE UP (ref 15–37)
BILIRUB SERPL-MCNC: 1.3 MG/DL — HIGH (ref 0.2–1.2)
BUN SERPL-MCNC: 8 MG/DL — SIGNIFICANT CHANGE UP (ref 7–23)
CALCIUM SERPL-MCNC: 8.9 MG/DL — SIGNIFICANT CHANGE UP (ref 8.5–10.1)
CHLORIDE SERPL-SCNC: 105 MMOL/L — SIGNIFICANT CHANGE UP (ref 96–108)
CO2 SERPL-SCNC: 29 MMOL/L — SIGNIFICANT CHANGE UP (ref 22–31)
CREAT SERPL-MCNC: 0.65 MG/DL — SIGNIFICANT CHANGE UP (ref 0.5–1.3)
GLUCOSE SERPL-MCNC: 98 MG/DL — SIGNIFICANT CHANGE UP (ref 70–99)
POTASSIUM SERPL-MCNC: 3.8 MMOL/L — SIGNIFICANT CHANGE UP (ref 3.5–5.3)
POTASSIUM SERPL-SCNC: 3.8 MMOL/L — SIGNIFICANT CHANGE UP (ref 3.5–5.3)
PROT SERPL-MCNC: 6.7 G/DL — SIGNIFICANT CHANGE UP (ref 6–8.3)
SODIUM SERPL-SCNC: 141 MMOL/L — SIGNIFICANT CHANGE UP (ref 135–145)

## 2019-07-08 PROCEDURE — 99232 SBSQ HOSP IP/OBS MODERATE 35: CPT

## 2019-07-08 PROCEDURE — 88304 TISSUE EXAM BY PATHOLOGIST: CPT | Mod: 26

## 2019-07-08 RX ORDER — MORPHINE SULFATE 50 MG/1
4 CAPSULE, EXTENDED RELEASE ORAL EVERY 4 HOURS
Refills: 0 | Status: DISCONTINUED | OUTPATIENT
Start: 2019-07-08 | End: 2019-07-09

## 2019-07-08 RX ORDER — ENOXAPARIN SODIUM 100 MG/ML
40 INJECTION SUBCUTANEOUS EVERY 24 HOURS
Refills: 0 | Status: DISCONTINUED | OUTPATIENT
Start: 2019-07-09 | End: 2019-07-09

## 2019-07-08 RX ORDER — HYDROMORPHONE HYDROCHLORIDE 2 MG/ML
0.5 INJECTION INTRAMUSCULAR; INTRAVENOUS; SUBCUTANEOUS ONCE
Refills: 0 | Status: DISCONTINUED | OUTPATIENT
Start: 2019-07-08 | End: 2019-07-08

## 2019-07-08 RX ORDER — SODIUM CHLORIDE 9 MG/ML
1000 INJECTION, SOLUTION INTRAVENOUS
Refills: 0 | Status: DISCONTINUED | OUTPATIENT
Start: 2019-07-08 | End: 2019-07-09

## 2019-07-08 RX ORDER — PIPERACILLIN AND TAZOBACTAM 4; .5 G/20ML; G/20ML
3.38 INJECTION, POWDER, LYOPHILIZED, FOR SOLUTION INTRAVENOUS ONCE
Refills: 0 | Status: COMPLETED | OUTPATIENT
Start: 2019-07-08 | End: 2019-07-08

## 2019-07-08 RX ORDER — ONDANSETRON 8 MG/1
4 TABLET, FILM COATED ORAL EVERY 6 HOURS
Refills: 0 | Status: DISCONTINUED | OUTPATIENT
Start: 2019-07-08 | End: 2019-07-09

## 2019-07-08 RX ORDER — ACETAMINOPHEN 500 MG
1000 TABLET ORAL ONCE
Refills: 0 | Status: DISCONTINUED | OUTPATIENT
Start: 2019-07-09 | End: 2019-07-09

## 2019-07-08 RX ORDER — OXYCODONE AND ACETAMINOPHEN 5; 325 MG/1; MG/1
2 TABLET ORAL EVERY 4 HOURS
Refills: 0 | Status: DISCONTINUED | OUTPATIENT
Start: 2019-07-08 | End: 2019-07-09

## 2019-07-08 RX ORDER — ACETAMINOPHEN 500 MG
1000 TABLET ORAL ONCE
Refills: 0 | Status: COMPLETED | OUTPATIENT
Start: 2019-07-08 | End: 2019-07-08

## 2019-07-08 RX ORDER — ACETAMINOPHEN 500 MG
1000 TABLET ORAL ONCE
Refills: 0 | Status: COMPLETED | OUTPATIENT
Start: 2019-07-09 | End: 2019-07-09

## 2019-07-08 RX ORDER — OXYCODONE AND ACETAMINOPHEN 5; 325 MG/1; MG/1
1 TABLET ORAL EVERY 4 HOURS
Refills: 0 | Status: DISCONTINUED | OUTPATIENT
Start: 2019-07-08 | End: 2019-07-09

## 2019-07-08 RX ORDER — HYDROMORPHONE HYDROCHLORIDE 2 MG/ML
0.5 INJECTION INTRAMUSCULAR; INTRAVENOUS; SUBCUTANEOUS
Refills: 0 | Status: DISCONTINUED | OUTPATIENT
Start: 2019-07-08 | End: 2019-07-08

## 2019-07-08 RX ORDER — PIPERACILLIN AND TAZOBACTAM 4; .5 G/20ML; G/20ML
3.38 INJECTION, POWDER, LYOPHILIZED, FOR SOLUTION INTRAVENOUS EVERY 8 HOURS
Refills: 0 | Status: DISCONTINUED | OUTPATIENT
Start: 2019-07-08 | End: 2019-07-09

## 2019-07-08 RX ORDER — SODIUM CHLORIDE 9 MG/ML
1000 INJECTION, SOLUTION INTRAVENOUS
Refills: 0 | Status: DISCONTINUED | OUTPATIENT
Start: 2019-07-08 | End: 2019-07-08

## 2019-07-08 RX ORDER — PANTOPRAZOLE SODIUM 20 MG/1
40 TABLET, DELAYED RELEASE ORAL DAILY
Refills: 0 | Status: DISCONTINUED | OUTPATIENT
Start: 2019-07-08 | End: 2019-07-09

## 2019-07-08 RX ORDER — ONDANSETRON 8 MG/1
4 TABLET, FILM COATED ORAL EVERY 6 HOURS
Refills: 0 | Status: DISCONTINUED | OUTPATIENT
Start: 2019-07-08 | End: 2019-07-08

## 2019-07-08 RX ORDER — CEFAZOLIN SODIUM 1 G
2000 VIAL (EA) INJECTION ONCE
Refills: 0 | Status: COMPLETED | OUTPATIENT
Start: 2019-07-08 | End: 2019-07-08

## 2019-07-08 RX ORDER — ONDANSETRON 8 MG/1
4 TABLET, FILM COATED ORAL ONCE
Refills: 0 | Status: DISCONTINUED | OUTPATIENT
Start: 2019-07-08 | End: 2019-07-08

## 2019-07-08 RX ADMIN — ONDANSETRON 4 MILLIGRAM(S): 8 TABLET, FILM COATED ORAL at 09:56

## 2019-07-08 RX ADMIN — MORPHINE SULFATE 4 MILLIGRAM(S): 50 CAPSULE, EXTENDED RELEASE ORAL at 03:30

## 2019-07-08 RX ADMIN — SODIUM CHLORIDE 75 MILLILITER(S): 9 INJECTION, SOLUTION INTRAVENOUS at 14:55

## 2019-07-08 RX ADMIN — Medication 1000 MILLIGRAM(S): at 18:12

## 2019-07-08 RX ADMIN — SODIUM CHLORIDE 100 MILLILITER(S): 9 INJECTION, SOLUTION INTRAVENOUS at 17:13

## 2019-07-08 RX ADMIN — MORPHINE SULFATE 4 MILLIGRAM(S): 50 CAPSULE, EXTENDED RELEASE ORAL at 06:28

## 2019-07-08 RX ADMIN — MORPHINE SULFATE 4 MILLIGRAM(S): 50 CAPSULE, EXTENDED RELEASE ORAL at 02:37

## 2019-07-08 RX ADMIN — HYDROMORPHONE HYDROCHLORIDE 0.5 MILLIGRAM(S): 2 INJECTION INTRAMUSCULAR; INTRAVENOUS; SUBCUTANEOUS at 09:56

## 2019-07-08 RX ADMIN — PIPERACILLIN AND TAZOBACTAM 200 GRAM(S): 4; .5 INJECTION, POWDER, LYOPHILIZED, FOR SOLUTION INTRAVENOUS at 18:05

## 2019-07-08 RX ADMIN — HYDROMORPHONE HYDROCHLORIDE 0.5 MILLIGRAM(S): 2 INJECTION INTRAMUSCULAR; INTRAVENOUS; SUBCUTANEOUS at 10:12

## 2019-07-08 RX ADMIN — Medication 400 MILLIGRAM(S): at 17:42

## 2019-07-08 NOTE — BRIEF OPERATIVE NOTE - NSICDXBRIEFPOSTOP_GEN_ALL_CORE_FT
POST-OP DIAGNOSIS:  Abdominal adhesions 08-Jul-2019 16:33:23  Leonardo Grant  Acute cholecystitis 08-Jul-2019 16:32:36  Leonardo Grant

## 2019-07-08 NOTE — PROGRESS NOTE ADULT - ASSESSMENT
Светлана [facility]  Cardiology   Spectra #[phone] 68 year old white female PMH mild COPD, cholelithiasis ,HLD (not on medications) who presents with  c/o severe RUQ pain, with biliary colic. Pt. is scheduled for a lap mallory in the AM.      Cholecystitis  - EKG noted, with RBBB which was present on 7/4.  Patient has Hx of COPD which could be the etiology.  She has no cardiac Hx and has been asymptomatic  - Remains optimized for the OR from a cardiac point of view with no evidence of active ischemic heart disease, decompensated heart failure, severe obstructive valvular disease, or uncontrolled arrhythmia.  Continue routine hemodynamic monitoring  - Monitor and replete lytes  - Pain control  - Will continue to follow    Светлана [facility]  Cardiology   Spectra #[phone]

## 2019-07-08 NOTE — PROGRESS NOTE ADULT - SUBJECTIVE AND OBJECTIVE BOX
[facility] -- Gabrielle Washburn, Julianne Bales Pannella, Patel, Savella  Office # [phone]    Follow Up:  Preop Eval    Subjective/Observations:    REVIEW OF SYSTEMS: All other review of systems is negative unless indicated above  PAST MEDICAL & SURGICAL HISTORY:  Cholelithiasis  S/P  section    MEDICATIONS  (STANDING):  HYDROmorphone  Injectable 0.5 milliGRAM(s) IV Push once  lactated ringers. 1000 milliLiter(s) (75 mL/Hr) IV Continuous <Continuous>  pantoprazole  Injectable 40 milliGRAM(s) IV Push daily    MEDICATIONS  (PRN):  morphine  - Injectable 4 milliGRAM(s) IV Push every 4 hours PRN Moderate Pain (4 - 6)  ondansetron Injectable 4 milliGRAM(s) IV Push every 6 hours PRN Nausea    Allergies    No Known Allergies    Intolerances      Vital Signs Last 24 Hrs  T(C): 36.8 (2019 08:02), Max: 36.8 (2019 08:02)  T(F): 98.2 (2019 08:02), Max: 98.2 (2019 08:02)  HR: 62 (2019 08:02) (62 - 71)  BP: 158/85 (2019 08:02) (146/72 - 158/85)  BP(mean): --  RR: 16 (2019 08:02) (16 - 16)  SpO2: 96% (2019 08:02) (96% - 96%)  I&O's Summary    2019 07:01  -  2019 07:00  --------------------------------------------------------  IN: 1340 mL / OUT: 0 mL / NET: 1340 mL        PHYSICAL EXAM:  TELE:   Constitutional: NAD, awake and alert, well-developed  HEENT: Moist Mucous Membranes, Anicteric  Pulmonary: Non-labored, breath sounds are clear bilaterally, No wheezing, rales or rhonchi  Cardiovascular: Regular, S1 and S2, No murmurs, rubs, gallops or clicks  Gastrointestinal: Bowel Sounds present, soft, nontender.   Lymph: No peripheral edema. No lymphadenopathy.  Skin: No visible rashes or ulcers.  Psych:  Mood & affect appropriate  LABS: All Labs Reviewed:                        12.3   5.22  )-----------( 301      ( 2019 09:51 )             35.7                         15.1   8.28  )-----------( 387      ( 2019 10:55 )             43.5     2019 07:11    141    |  105    |  8      ----------------------------<  98     3.8     |  29     |  0.65   2019 09:51    141    |  107    |  13     ----------------------------<  100    3.9     |  27     |  0.78   2019 10:55    136    |  102    |  14     ----------------------------<  122    4.2     |  27     |  0.81     Ca    8.9        2019 07:11  Ca    8.3        2019 09:51  Ca    9.8        2019 10:55    TPro  6.7    /  Alb  3.3    /  TBili  1.3    /  DBili  x      /  AST  17     /  ALT  21     /  AlkPhos  51     2019 07:11  TPro  6.2    /  Alb  3.0    /  TBili  1.4    /  DBili  x      /  AST  13     /  ALT  21     /  AlkPhos  49     2019 09:51  TPro  8.5    /  Alb  4.2    /  TBili  1.1    /  DBili  x      /  AST  15     /  ALT  24     /  AlkPhos  66     2019 10:55    PT/INR - ( 2019 09:51 )   PT: 12.1 sec;   INR: 1.06 ratio [facility] -- Gabrielle Washburn, Julianne Bales, Miguel Lopez Savella  Office # [phone]    Follow Up:  Preop Eval with RBBB    Subjective/Observations: Seen and evaluated, comfortable on RA.  Denies SOB, SABA, or orthopnea.  Denies CP or palpitations.  c/o abdominal pain radiating to lower abdomen associated with nausea    REVIEW OF SYSTEMS: All other review of systems is negative unless indicated above  PAST MEDICAL & SURGICAL HISTORY:  Cholelithiasis  S/P  section    MEDICATIONS  (STANDING):  HYDROmorphone  Injectable 0.5 milliGRAM(s) IV Push once  lactated ringers. 1000 milliLiter(s) (75 mL/Hr) IV Continuous <Continuous>  pantoprazole  Injectable 40 milliGRAM(s) IV Push daily    MEDICATIONS  (PRN):  morphine  - Injectable 4 milliGRAM(s) IV Push every 4 hours PRN Moderate Pain (4 - 6)  ondansetron Injectable 4 milliGRAM(s) IV Push every 6 hours PRN Nausea    Allergies    No Known Allergies    Intolerances    Vital Signs Last 24 Hrs  T(C): 36.8 (2019 08:02), Max: 36.8 (2019 08:02)  T(F): 98.2 (2019 08:02), Max: 98.2 (2019 08:02)  HR: 62 (2019 08:02) (62 - 71)  BP: 158/85 (2019 08:02) (146/72 - 158/85)  BP(mean): --  RR: 16 (2019 08:02) (16 - 16)  SpO2: 96% (2019 08:02) (96% - 96%)  I&O's Summary    2019 07:01  -  2019 07:00  --------------------------------------------------------  IN: 1340 mL / OUT: 0 mL / NET: 1340 mL        PHYSICAL EXAM:  TELE: Not on tele  Constitutional: NAD, awake and alert, well-developed  HEENT: Moist Mucous Membranes, Anicteric  Pulmonary: Non-labored, breath sounds are clear bilaterally, No wheezing, rales or rhonchi  Cardiovascular: Regular, S1 and S2, No murmurs, rubs, gallops or clicks  Gastrointestinal: Bowel Sounds present, soft, nontender.   Lymph: No peripheral edema. No lymphadenopathy.  Skin: No visible rashes or ulcers.  Psych:  Mood & affect appropriate  LABS: All Labs Reviewed:                        12.3   5.22  )-----------( 301      ( 2019 09:51 )             35.7                         15.1   8.28  )-----------( 387      ( 2019 10:55 )             43.5     2019 07:11    141    |  105    |  8      ----------------------------<  98     3.8     |  29     |  0.65   2019 09:51    141    |  107    |  13     ----------------------------<  100    3.9     |  27     |  0.78   2019 10:55    136    |  102    |  14     ----------------------------<  122    4.2     |  27     |  0.81     Ca    8.9        2019 07:11  Ca    8.3        2019 09:51  Ca    9.8        2019 10:55    TPro  6.7    /  Alb  3.3    /  TBili  1.3    /  DBili  x      /  AST  17     /  ALT  21     /  AlkPhos  51     2019 07:11  TPro  6.2    /  Alb  3.0    /  TBili  1.4    /  DBili  x      /  AST  13     /  ALT  21     /  AlkPhos  49     2019 09:51  TPro  8.5    /  Alb  4.2    /  TBili  1.1    /  DBili  x      /  AST  15     /  ALT  24     /  AlkPhos  66     2019 10:55    PT/INR - ( 2019 09:51 )   PT: 12.1 sec;   INR: 1.06 ratio      < from: 12 Lead ECG (19 @ 14:13) >    Ventricular Rate 78 BPM    Atrial Rate 78 BPM    P-R Interval 142 ms    QRS Duration 96 ms    Q-T Interval 418 ms    QTC Calculation(Bezet) 476 ms    P Axis 38 degrees    R Axis -40 degrees    T Axis 48 degrees    Diagnosis Line Normal sinus rhythm  Left axis deviation  Incomplete right bundle branch block    Confirmed by RANJANA LOPEZ ([id number]) on 2019 8:18:20 AM    < end of copied text >    < from: US Abdomen Upper Quadrant Right (19 @ 14:48) >    EXAM:  US ABDOMEN RT UPR QUADRANT                            PROCEDURE DATE:  2019            INTERPRETATION:  CLINICAL INFORMATION: Right upper quadrant abdominal   pain. History of gallstones.    COMPARISON: None available.    TECHNIQUE: Sonography of the right upper quadrant.     FINDINGS:    Liver: Echogenic parenchyma.    Bile ducts: Normal caliber. Common bile duct measures 3 mm.     Gallbladder: Cholelithiasis and sludge. No sonographic Lynch's sign.   Patient was not premedicated.    Pancreas: Visualized portions are within normal limits.    Right kidney: 9.5 cm. No hydronephrosis.    Ascites: None.    IVC: Visualized portions are within normal limits.    IMPRESSION:     Cholelithiasis and sludge noted in the gallbladder. No pericholecystic   fluid, wall thickening or local tenderness to suggest acute cholecystitis.    ANGELLA PAEZ M.D., RADIOLOGY RESIDENT  This document has been electronically signed.  ERICA KAY M.D., ATTENDING RADIOLOGIST  This document has been electronically signed. 2019  3:19PM      < end of copied text >

## 2019-07-08 NOTE — BRIEF OPERATIVE NOTE - OPERATION/FINDINGS
Distended, edematous gallbladder with pericholecystic fatty deposition c/w severe cholecystitis.  Intra-abdominal adhesions of omentum/small bowel to abdominal wall, adhesions of omentum to gallbladder.

## 2019-07-08 NOTE — DISCHARGE NOTE PROVIDER - NSDCCPTREATMENT_GEN_ALL_CORE_FT
PRINCIPAL PROCEDURE  Procedure: Laparoscopic cholecystectomy  Findings and Treatment:       SECONDARY PROCEDURE  Procedure: Laparoscopic lysis of abdominal adhesions  Findings and Treatment:

## 2019-07-08 NOTE — PROGRESS NOTE ADULT - SUBJECTIVE AND OBJECTIVE BOX
Patient is a 68y old  Female who presents with a chief complaint of acute cholecystitis (08 Jul 2019 09:48)  feels well, other than upper abdominal discomfort  denies chest pain, denies shortness of breath      INTERVAL HPI/OVERNIGHT EVENTS:  T(C): 36.8 (07-08-19 @ 08:02), Max: 36.8 (07-08-19 @ 08:02)  HR: 62 (07-08-19 @ 08:02) (62 - 71)  BP: 158/85 (07-08-19 @ 08:02) (146/72 - 158/85)  RR: 16 (07-08-19 @ 08:02) (16 - 16)  SpO2: 96% (07-08-19 @ 08:02) (96% - 96%)  Wt(kg): --  I&O's Summary    07 Jul 2019 07:01  -  08 Jul 2019 07:00  --------------------------------------------------------  IN: 1340 mL / OUT: 0 mL / NET: 1340 mL        LABS:                        12.3   5.22  )-----------( 301      ( 07 Jul 2019 09:51 )             35.7     07-08    141  |  105  |  8   ----------------------------<  98  3.8   |  29  |  0.65    Ca    8.9      08 Jul 2019 07:11    TPro  6.7  /  Alb  3.3  /  TBili  1.3<H>  /  DBili  x   /  AST  17  /  ALT  21  /  AlkPhos  51  07-08    PT/INR - ( 07 Jul 2019 09:51 )   PT: 12.1 sec;   INR: 1.06 ratio             CAPILLARY BLOOD GLUCOSE                MEDICATIONS  (STANDING):  lactated ringers. 1000 milliLiter(s) (75 mL/Hr) IV Continuous <Continuous>  pantoprazole  Injectable 40 milliGRAM(s) IV Push daily    MEDICATIONS  (PRN):  morphine  - Injectable 4 milliGRAM(s) IV Push every 4 hours PRN Moderate Pain (4 - 6)  ondansetron Injectable 4 milliGRAM(s) IV Push every 6 hours PRN Nausea      REVIEW OF SYSTEMS:  CONSTITUTIONAL: No fever, weight loss, or fatigue  EYES: No eye pain, visual disturbances, or discharge  ENMT:  No difficulty hearing, tinnitus, vertigo; No sinus or throat pain  NECK: No pain or stiffness  RESPIRATORY: No cough, wheezing, chills or hemoptysis; No shortness of breath  CARDIOVASCULAR: No chest pain, palpitations, dizziness, or leg swelling  GASTROINTESTINAL: upper abd discomfort  GENITOURINARY: No dysuria, frequency, hematuria, or incontinence  NEUROLOGICAL: No headaches, memory loss, loss of strength, numbness, or tremors  SKIN: No itching, burning, rashes, or lesions   LYMPH NODES: No enlarged glands  ENDOCRINE: No heat or cold intolerance; No hair loss  MUSCULOSKELETAL: No joint pain or swelling; No muscle, back, or extremity pain  PSYCHIATRIC: No depression, anxiety, mood swings, or difficulty sleeping  HEME/LYMPH: No easy bruising, or bleeding gums  ALLERY AND IMMUNOLOGIC: No hives or eczema    RADIOLOGY & ADDITIONAL TESTS:    Imaging Personally Reviewed:  [ ] YES  [ ] NO    Consultant(s) Notes Reviewed:  [ ] YES  [ ] NO    PHYSICAL EXAM:  GENERAL: NAD, well-groomed, well-developed  HEAD:  Atraumatic, Normocephalic  EYES: EOMI, PERRLA, conjunctiva and sclera clear  ENMT: No tonsillar erythema, exudates, or enlargement; Moist mucous membranes, Good dentition, No lesions  NECK: Supple, No JVD, Normal thyroid  NERVOUS SYSTEM:  Alert & Oriented X3, Good concentration; Motor Strength 5/5 B/L upper and lower extremities; DTRs 2+ intact and symmetric  CHEST/LUNG: Clear to percussion bilaterally; No rales, rhonchi, wheezing, or rubs  HEART: Regular rate and rhythm; No murmurs, rubs, or gallops  ABDOMEN: upper abd tenderness to palp  EXTREMITIES:  2+ Peripheral Pulses, No clubbing, cyanosis, or edema  LYMPH: No lymphadenopathy noted  SKIN: No rashes or lesions    Care Discussed with Consultants/Other Providers [ ] YES  [ ] NO

## 2019-07-08 NOTE — DISCHARGE NOTE PROVIDER - HOSPITAL COURSE
19 68y Female  with PSHx of  presented to the ED with c/o severe RUQ pain. Pt stated she has had a long history of gallbladder attacks.  She was in her usual state of good health until five days prior to admission when she developed severe RUQ pain. Her pain was sharp with radiation to her back.  She had multiple episodes of vomiting.  She was seen by [facility] and discharged.  She continued with severe nausea and vomiting, and pain and decided to go to [facility]. Denied fevers, chills and night sweats, jaundice. In ER patient had normal Wc, normal liver enzymes, USGB showing Gallstones and gallbladder sludge completely filling the gallbladder as seen on 2019. Pt was diagnosed with acute cholecystitis and cholelithiasis and scheduled for laparoscopic cholecystectomy.        Hospital course:        on  patient determined to have elevated bilirubin and lap mallory delayed 24 hours. On  labs revealed downtrending t-bili and patient was scheduled for lap mallory. Patient underwent successful lap mallory and lysis of adhesions, and was sent to PACU then to the floor for monitoring.          Disposition: Patient to follow-up with  as outpatient in 1 week. [age]y Female  with PSHx of  presented to the ED with c/o severe RUQ pain. Pt stated she has had a long history of gallbladder attacks.  She was in her usual state of good health until five days prior to admission when she developed severe RUQ pain. Her pain was sharp with radiation to her back.  She had multiple episodes of vomiting.  She was seen by [facility] and discharged.  She continued with severe nausea and vomiting, and pain and decided to go to [facility]. Denied fevers, chills and night sweats, jaundice. In ER patient had normal Wc, normal liver enzymes, USGB showing Gallstones and gallbladder sludge completely filling the gallbladder as seen on 2019. Pt was diagnosed with acute cholecystitis and cholelithiasis and scheduled for laparoscopic cholecystectomy.        Hospital course:        on  patient determined to have elevated bilirubin and lap mallory delayed 24 hours. On  labs revealed downtrending t-bili and patient was scheduled for lap mallory. Patient underwent successful lap mallory and lysis of adhesions, and was sent to PACU then to the floor for monitoring.          Disposition: Patient to follow-up with Dr. Buchanan as outpatient in 1 week. 19 68y Female  with PSHx of  presented to the ED with c/o severe RUQ pain. Pt stated she has had a long history of gallbladder attacks.  She was in her usual state of good health until five days prior to admission when she developed severe RUQ pain. Her pain was sharp with radiation to her back.  She had multiple episodes of vomiting.  She was seen by [facility] and discharged.  She continued with severe nausea and vomiting, and pain and decided to go to [facility]. Denied fevers, chills and night sweats, jaundice. In ER patient had normal Wc, normal liver enzymes, USGB showing Gallstones and gallbladder sludge completely filling the gallbladder as seen on 2019. Pt was diagnosed with acute cholecystitis and cholelithiasis and scheduled for laparoscopic cholecystectomy.        Hospital course:        on  patient determined to have elevated bilirubin and lap mallory delayed 24 hours. On  labs revealed downtrending t-bili and patient was scheduled for lap mallory. Patient underwent successful lap mallory and lysis of adhesions, and was sent to PACU then to the floor for monitoring.  ON  she was tolerating a low fat diet.  She was discharged home with plans to follow up in the office.        Disposition: Patient to follow-up with Dr. Buchanan as outpatient in 1 week.

## 2019-07-08 NOTE — DISCHARGE NOTE PROVIDER - NSDCFUADDINST_GEN_ALL_CORE_FT
Keep steri-strips clean, dry and intact x 24 hrs. Apply water proof ice pack 20 mins on, 20 mins off to help decrease pain and swelling. You may begin showering as usual but Do NOT remove steri-strips. Do not scrub or soak incision site. Pat dry. NO tub baths, NO swimming pools. No hot tubs.  Do not lift anything over 10 lbs.  Take frequent walks.  You may take over the counter stool softener such as colace as needed for constipation while taking pain medication.  DO NOT DRIVE WHILE TAKING PAIN MEDICATION.

## 2019-07-08 NOTE — DISCHARGE NOTE PROVIDER - NSDCACTIVITY_GEN_ALL_CORE
Showering allowed/Do not make important decisions/No heavy lifting/straining/Do not drive or operate machinery

## 2019-07-08 NOTE — BRIEF OPERATIVE NOTE - NSICDXBRIEFPROCEDURE_GEN_ALL_CORE_FT
PROCEDURES:  Laparoscopic lysis of abdominal adhesions 08-Jul-2019 16:33:33  Leonardo Grant  Laparoscopic cholecystectomy 08-Jul-2019 16:32:54  Leonardo Grant

## 2019-07-08 NOTE — DISCHARGE NOTE PROVIDER - CARE PROVIDER_API CALL
Barry Buchanan)  Surgery  [address] [place] [address]  Phone: [phone]  Fax: [phone]  Follow Up Time: 1 week

## 2019-07-09 VITALS
DIASTOLIC BLOOD PRESSURE: 73 MMHG | OXYGEN SATURATION: 96 % | TEMPERATURE: 99 F | HEART RATE: 72 BPM | SYSTOLIC BLOOD PRESSURE: 137 MMHG | RESPIRATION RATE: 16 BRPM

## 2019-07-09 LAB
ALBUMIN SERPL ELPH-MCNC: 3.4 G/DL — SIGNIFICANT CHANGE UP (ref 3.3–5)
ALP SERPL-CCNC: 89 U/L — SIGNIFICANT CHANGE UP (ref 40–120)
ALT FLD-CCNC: 119 U/L — HIGH (ref 12–78)
ANION GAP SERPL CALC-SCNC: 8 MMOL/L — SIGNIFICANT CHANGE UP (ref 5–17)
AST SERPL-CCNC: 113 U/L — HIGH (ref 15–37)
BASOPHILS # BLD AUTO: 0 K/UL — SIGNIFICANT CHANGE UP (ref 0–0.2)
BASOPHILS NFR BLD AUTO: 0 % — SIGNIFICANT CHANGE UP (ref 0–2)
BILIRUB SERPL-MCNC: 1.3 MG/DL — HIGH (ref 0.2–1.2)
BUN SERPL-MCNC: 12 MG/DL — SIGNIFICANT CHANGE UP (ref 7–23)
CALCIUM SERPL-MCNC: 9.1 MG/DL — SIGNIFICANT CHANGE UP (ref 8.5–10.1)
CHLORIDE SERPL-SCNC: 102 MMOL/L — SIGNIFICANT CHANGE UP (ref 96–108)
CO2 SERPL-SCNC: 29 MMOL/L — SIGNIFICANT CHANGE UP (ref 22–31)
CREAT SERPL-MCNC: 0.81 MG/DL — SIGNIFICANT CHANGE UP (ref 0.5–1.3)
EOSINOPHIL # BLD AUTO: 0 K/UL — SIGNIFICANT CHANGE UP (ref 0–0.5)
EOSINOPHIL NFR BLD AUTO: 0 % — SIGNIFICANT CHANGE UP (ref 0–6)
GLUCOSE SERPL-MCNC: 113 MG/DL — HIGH (ref 70–99)
HCT VFR BLD CALC: 38.1 % — SIGNIFICANT CHANGE UP (ref 34.5–45)
HGB BLD-MCNC: 13.3 G/DL — SIGNIFICANT CHANGE UP (ref 11.5–15.5)
IMM GRANULOCYTES NFR BLD AUTO: 0.1 % — SIGNIFICANT CHANGE UP (ref 0–1.5)
LYMPHOCYTES # BLD AUTO: 0.79 K/UL — LOW (ref 1–3.3)
LYMPHOCYTES # BLD AUTO: 11.5 % — LOW (ref 13–44)
MCHC RBC-ENTMCNC: 29.4 PG — SIGNIFICANT CHANGE UP (ref 27–34)
MCHC RBC-ENTMCNC: 34.9 GM/DL — SIGNIFICANT CHANGE UP (ref 32–36)
MCV RBC AUTO: 84.1 FL — SIGNIFICANT CHANGE UP (ref 80–100)
MONOCYTES # BLD AUTO: 0.51 K/UL — SIGNIFICANT CHANGE UP (ref 0–0.9)
MONOCYTES NFR BLD AUTO: 7.5 % — SIGNIFICANT CHANGE UP (ref 2–14)
NEUTROPHILS # BLD AUTO: 5.53 K/UL — SIGNIFICANT CHANGE UP (ref 1.8–7.4)
NEUTROPHILS NFR BLD AUTO: 80.9 % — HIGH (ref 43–77)
NRBC # BLD: 0 /100 WBCS — SIGNIFICANT CHANGE UP (ref 0–0)
PLATELET # BLD AUTO: 323 K/UL — SIGNIFICANT CHANGE UP (ref 150–400)
POTASSIUM SERPL-MCNC: 3.7 MMOL/L — SIGNIFICANT CHANGE UP (ref 3.5–5.3)
POTASSIUM SERPL-SCNC: 3.7 MMOL/L — SIGNIFICANT CHANGE UP (ref 3.5–5.3)
PROT SERPL-MCNC: 6.9 G/DL — SIGNIFICANT CHANGE UP (ref 6–8.3)
RBC # BLD: 4.53 M/UL — SIGNIFICANT CHANGE UP (ref 3.8–5.2)
RBC # FLD: 12.3 % — SIGNIFICANT CHANGE UP (ref 10.3–14.5)
SODIUM SERPL-SCNC: 139 MMOL/L — SIGNIFICANT CHANGE UP (ref 135–145)
WBC # BLD: 6.84 K/UL — SIGNIFICANT CHANGE UP (ref 3.8–10.5)
WBC # FLD AUTO: 6.84 K/UL — SIGNIFICANT CHANGE UP (ref 3.8–10.5)

## 2019-07-09 PROCEDURE — 86901 BLOOD TYPING SEROLOGIC RH(D): CPT

## 2019-07-09 PROCEDURE — 99232 SBSQ HOSP IP/OBS MODERATE 35: CPT

## 2019-07-09 PROCEDURE — 99285 EMERGENCY DEPT VISIT HI MDM: CPT | Mod: 25

## 2019-07-09 PROCEDURE — 93005 ELECTROCARDIOGRAM TRACING: CPT

## 2019-07-09 PROCEDURE — 85610 PROTHROMBIN TIME: CPT

## 2019-07-09 PROCEDURE — 96375 TX/PRO/DX INJ NEW DRUG ADDON: CPT

## 2019-07-09 PROCEDURE — 86900 BLOOD TYPING SEROLOGIC ABO: CPT

## 2019-07-09 PROCEDURE — 85027 COMPLETE CBC AUTOMATED: CPT

## 2019-07-09 PROCEDURE — 88304 TISSUE EXAM BY PATHOLOGIST: CPT

## 2019-07-09 PROCEDURE — 82150 ASSAY OF AMYLASE: CPT

## 2019-07-09 PROCEDURE — 76705 ECHO EXAM OF ABDOMEN: CPT

## 2019-07-09 PROCEDURE — 80053 COMPREHEN METABOLIC PANEL: CPT

## 2019-07-09 PROCEDURE — 83690 ASSAY OF LIPASE: CPT

## 2019-07-09 PROCEDURE — 36415 COLL VENOUS BLD VENIPUNCTURE: CPT

## 2019-07-09 PROCEDURE — 86803 HEPATITIS C AB TEST: CPT

## 2019-07-09 PROCEDURE — 96374 THER/PROPH/DIAG INJ IV PUSH: CPT

## 2019-07-09 PROCEDURE — 86850 RBC ANTIBODY SCREEN: CPT

## 2019-07-09 RX ADMIN — Medication 400 MILLIGRAM(S): at 09:39

## 2019-07-09 RX ADMIN — Medication 1000 MILLIGRAM(S): at 03:00

## 2019-07-09 RX ADMIN — Medication 1000 MILLIGRAM(S): at 09:35

## 2019-07-09 RX ADMIN — PIPERACILLIN AND TAZOBACTAM 25 GRAM(S): 4; .5 INJECTION, POWDER, LYOPHILIZED, FOR SOLUTION INTRAVENOUS at 02:32

## 2019-07-09 RX ADMIN — PIPERACILLIN AND TAZOBACTAM 25 GRAM(S): 4; .5 INJECTION, POWDER, LYOPHILIZED, FOR SOLUTION INTRAVENOUS at 11:27

## 2019-07-09 RX ADMIN — Medication 400 MILLIGRAM(S): at 02:31

## 2019-07-09 NOTE — PROGRESS NOTE ADULT - SUBJECTIVE AND OBJECTIVE BOX
Patient is a 68y old  Female who presents with a chief complaint of acute cholecystitis (09 Jul 2019 11:37)  feels well, no further pain a/p lap mallory      INTERVAL HPI/OVERNIGHT EVENTS:  T(C): 36.7 (07-09-19 @ 08:14), Max: 37.1 (07-08-19 @ 19:00)  HR: 69 (07-09-19 @ 08:14) (63 - 99)  BP: 145/91 (07-09-19 @ 08:14) (120/78 - 192/85)  RR: 16 (07-09-19 @ 08:14) (14 - 21)  SpO2: 95% (07-09-19 @ 08:14) (93% - 100%)  Wt(kg): --  I&O's Summary    08 Jul 2019 07:01  -  09 Jul 2019 07:00  --------------------------------------------------------  IN: 825 mL / OUT: 1100 mL / NET: -275 mL        LABS:                        13.3   6.84  )-----------( 323      ( 09 Jul 2019 08:19 )             38.1     07-09    139  |  102  |  12  ----------------------------<  113<H>  3.7   |  29  |  0.81    Ca    9.1      09 Jul 2019 08:19    TPro  6.9  /  Alb  3.4  /  TBili  1.3<H>  /  DBili  x   /  AST  113<H>  /  ALT  119<H>  /  AlkPhos  89  07-09        CAPILLARY BLOOD GLUCOSE                MEDICATIONS  (STANDING):  acetaminophen  IVPB .. 1000 milliGRAM(s) IV Intermittent once  enoxaparin Injectable 40 milliGRAM(s) SubCutaneous every 24 hours  lactated ringers. 1000 milliLiter(s) (75 mL/Hr) IV Continuous <Continuous>  pantoprazole  Injectable 40 milliGRAM(s) IV Push daily  piperacillin/tazobactam IVPB.. 3.375 Gram(s) IV Intermittent every 8 hours    MEDICATIONS  (PRN):  morphine  - Injectable 4 milliGRAM(s) IV Push every 4 hours PRN Severe Pain (7 - 10)  ondansetron Injectable 4 milliGRAM(s) IV Push every 6 hours PRN Nausea  oxyCODONE    5 mG/acetaminophen 325 mG 1 Tablet(s) Oral every 4 hours PRN Mild Pain (1 - 3)  oxyCODONE    5 mG/acetaminophen 325 mG 2 Tablet(s) Oral every 4 hours PRN Moderate Pain (4 - 6)      REVIEW OF SYSTEMS:  CONSTITUTIONAL: No fever, weight loss, or fatigue  EYES: No eye pain, visual disturbances, or discharge  ENMT:  No difficulty hearing, tinnitus, vertigo; No sinus or throat pain  NECK: No pain or stiffness  RESPIRATORY: No cough, wheezing, chills or hemoptysis; No shortness of breath  CARDIOVASCULAR: No chest pain, palpitations, dizziness, or leg swelling  GASTROINTESTINAL: No abdominal or epigastric pain. No nausea, vomiting, or hematemesis; No diarrhea or constipation. No melena or hematochezia.  GENITOURINARY: No dysuria, frequency, hematuria, or incontinence  NEUROLOGICAL: No headaches, memory loss, loss of strength, numbness, or tremors  SKIN: No itching, burning, rashes, or lesions   LYMPH NODES: No enlarged glands  ENDOCRINE: No heat or cold intolerance; No hair loss  MUSCULOSKELETAL: No joint pain or swelling; No muscle, back, or extremity pain  PSYCHIATRIC: No depression, anxiety, mood swings, or difficulty sleeping  HEME/LYMPH: No easy bruising, or bleeding gums  ALLERY AND IMMUNOLOGIC: No hives or eczema    RADIOLOGY & ADDITIONAL TESTS:    Imaging Personally Reviewed:  [ ] YES  [ ] NO    Consultant(s) Notes Reviewed:  [ ] YES  [ ] NO    PHYSICAL EXAM:  GENERAL: NAD, well-groomed, well-developed  HEAD:  Atraumatic, Normocephalic  EYES: EOMI, PERRLA, conjunctiva and sclera clear  ENMT: No tonsillar erythema, exudates, or enlargement; Moist mucous membranes, Good dentition, No lesions  NECK: Supple, No JVD, Normal thyroid  NERVOUS SYSTEM:  Alert & Oriented X3, Good concentration; Motor Strength 5/5 B/L upper and lower extremities; DTRs 2+ intact and symmetric  CHEST/LUNG: Clear to percussion bilaterally; No rales, rhonchi, wheezing, or rubs  HEART: Regular rate and rhythm; No murmurs, rubs, or gallops  ABDOMEN: Soft, Nontender, Nondistended; Bowel sounds present  EXTREMITIES:  2+ Peripheral Pulses, No clubbing, cyanosis, or edema  LYMPH: No lymphadenopathy noted  SKIN: No rashes or lesions    Care Discussed with Consultants/Other Providers [ ] YES  [ ] NO

## 2019-07-09 NOTE — PROGRESS NOTE ADULT - SUBJECTIVE AND OBJECTIVE BOX
Subjective: Pt OOB, tolerating a low fat diet.  Objective:  Vital Signs Last 24 Hrs  T(C): 36.7 (09 Jul 2019 08:14), Max: 37.1 (08 Jul 2019 19:00)  T(F): 98.1 (09 Jul 2019 08:14), Max: 98.8 (08 Jul 2019 19:00)  HR: 69 (09 Jul 2019 08:14) (63 - 99)  BP: 145/91 (09 Jul 2019 08:14) (120/78 - 192/85)  BP(mean): --  RR: 16 (09 Jul 2019 08:14) (14 - 21)  SpO2: 95% (09 Jul 2019 08:14) (93% - 100%)    Heent: BALDOMERO, FREOM  Pul: clear  Cor: RSR, without murmurs  Abdomen: normal bowel sounds, without distension, without tenderness  Incisions clean and closed  Extremities: without edema, motor/sensory intact, without calf pain, Homans sign negative.                        13.3   6.84  )-----------( 323      ( 09 Jul 2019 08:19 )             38.1       07-09    139  |  102  |  12  ----------------------------<  113<H>  3.7   |  29  |  0.81    Ca    9.1      09 Jul 2019 08:19    TPro  6.9  /  Alb  3.4  /  TBili  1.3<H>  /  DBili  x   /  AST  113<H>  /  ALT  119<H>  /  AlkPhos  89  07-09 07-08 @ 07:01  -  07-09 @ 07:00  --------------------------------------------------------  IN:    lactated ringers.: 200 mL    lactated ringers.: 525 mL    Solution: 100 mL  Total IN: 825 mL    OUT:    Voided: 1100 mL  Total OUT: 1100 mL    Total NET: -275 mL

## 2019-07-09 NOTE — PROGRESS NOTE ADULT - REASON FOR ADMISSION
acute cholecystitis

## 2019-07-09 NOTE — PROGRESS NOTE ADULT - ATTENDING COMMENTS
I personally saw and examined the patient in detail.  I have spoken to the above provider regarding the assessment and plan of care.  I reviewed the above assessment and plan of care, and agree.  I have made changes in the body of the note where appropriate.
Seen/examined. agree with above.

## 2019-07-09 NOTE — PROGRESS NOTE ADULT - SUBJECTIVE AND OBJECTIVE BOX
[facility] -- Gabrielle Washburn, Julianne Bales, Miguel Lopez Savella  Office # [phone]    Follow Up:  Preop Eval    Subjective/Observations: Awake and laert, no c/o postop pain at this time.  Denies any respiratory or cardiac discomfort    REVIEW OF SYSTEMS: All other review of systems is negative unless indicated above  PAST MEDICAL & SURGICAL HISTORY:  Cholelithiasis  S/P  section    MEDICATIONS  (STANDING):  acetaminophen  IVPB .. 1000 milliGRAM(s) IV Intermittent once  acetaminophen  IVPB .. 1000 milliGRAM(s) IV Intermittent once  enoxaparin Injectable 40 milliGRAM(s) SubCutaneous every 24 hours  lactated ringers. 1000 milliLiter(s) (75 mL/Hr) IV Continuous <Continuous>  pantoprazole  Injectable 40 milliGRAM(s) IV Push daily  piperacillin/tazobactam IVPB.. 3.375 Gram(s) IV Intermittent every 8 hours    MEDICATIONS  (PRN):  morphine  - Injectable 4 milliGRAM(s) IV Push every 4 hours PRN Severe Pain (7 - 10)  ondansetron Injectable 4 milliGRAM(s) IV Push every 6 hours PRN Nausea  oxyCODONE    5 mG/acetaminophen 325 mG 1 Tablet(s) Oral every 4 hours PRN Mild Pain (1 - 3)  oxyCODONE    5 mG/acetaminophen 325 mG 2 Tablet(s) Oral every 4 hours PRN Moderate Pain (4 - 6)    Allergies    No Known Allergies    Intolerances    Vital Signs Last 24 Hrs  T(C): 36.4 (2019 04:00), Max: 37.1 (2019 19:00)  T(F): 97.6 (2019 04:00), Max: 98.8 (2019 19:00)  HR: 69 (2019 04:00) (62 - 99)  BP: 120/78 (2019 04:00) (120/78 - 192/85)  BP(mean): --  RR: 16 (2019 04:00) (14 - 21)  SpO2: 94% (2019 04:00) (93% - 100%)  I&O's Summary    2019 07:01  -  2019 07:00  --------------------------------------------------------  IN: 825 mL / OUT: 1100 mL / NET: -275 mL      Weight (kg): 56.5 ( @ 10:14)  PHYSICAL EXAM:  TELE: Not on tele  Constitutional: NAD, awake and alert, well-developed  HEENT: Moist Mucous Membranes, Anicteric  Pulmonary: Non-labored, breath sounds are clear bilaterally, No wheezing, rales or rhonchi  Cardiovascular: Regular, S1 and S2, No murmurs, rubs, gallops or clicks  Gastrointestinal: Bowel Sounds present, soft, nontender.   Lymph: No peripheral edema. No lymphadenopathy.  Skin: No visible rashes or ulcers.  Abdominal incisions  Psych:  Mood & affect appropriate  LABS: All Labs Reviewed:                        12.3   5  )-----------( 301      ( 2019 09:51 )             35.7                         15.1   8.28  )-----------( 387      ( 2019 10:55 )             43.5     2019 07:11    141    |  105    |  8      ----------------------------<  98     3.8     |  29     |  0.65   2019 09:51    141    |  107    |  13     ----------------------------<  100    3.9     |  27     |  0.78   2019 10:55    136    |  102    |  14     ----------------------------<  122    4.2     |  27     |  0.81     Ca    8.9        2019 07:11  Ca    8.3        2019 09:51  Ca    9.8        2019 10:55    TPro  6.7    /  Alb  3.3    /  TBili  1.3    /  DBili  x      /  AST  17     /  ALT  21     /  AlkPhos  51     2019 07:11  TPro  6.2    /  Alb  3.0    /  TBili  1.4    /  DBili  x      /  AST  13     /  ALT  21     /  AlkPhos  49     2019 09:51  TPro  8.5    /  Alb  4.2    /  TBili  1.1    /  DBili  x      /  AST  15     /  ALT  24     /  AlkPhos  66     2019 10:55    PT/INR - ( 2019 09:51 )   PT: 12.1 sec;   INR: 1.06 ratio      < from: 12 Lead ECG (19 @ 14:13) >    Ventricular Rate 78 BPM    Atrial Rate 78 BPM    P-R Interval 142 ms    QRS Duration 96 ms    Q-T Interval 418 ms    QTC Calculation(Bezet) 476 ms    P Axis 38 degrees    R Axis -40 degrees    T Axis 48 degrees    Diagnosis Line Normal sinus rhythm  Left axis deviation  Incomplete right bundle branch block    Confirmed by RANJANA LOPEZ ([id number]) on 2019 8:18:20 AM    < end of copied text >    < from: US Abdomen Upper Quadrant Right (19 @ 14:48) >    EXAM:  US ABDOMEN RT UPR QUADRANT                            PROCEDURE DATE:  2019            INTERPRETATION:  CLINICAL INFORMATION: Right upper quadrant abdominal   pain. History of gallstones.    COMPARISON: None available.    TECHNIQUE: Sonography of the right upper quadrant.     FINDINGS:    Liver: Echogenic parenchyma.    Bile ducts: Normal caliber. Common bile duct measures 3 mm.     Gallbladder: Cholelithiasis and sludge. No sonographic Lynch's sign.   Patient was not premedicated.    Pancreas: Visualized portions are within normal limits.    Right kidney: 9.5 cm. No hydronephrosis.    Ascites: None.    IVC: Visualized portions are within normal limits.    IMPRESSION:     Cholelithiasis and sludge noted in the gallbladder. No pericholecystic   fluid, wall thickening or local tenderness to suggest acute cholecystitis.    ANGELLA PAEZ M.D., RADIOLOGY RESIDENT  This document has been electronically signed.  ERICA KAY M.D., ATTENDING RADIOLOGIST  This document has been electronically signed. 2019  3:19PM      < end of copied text >

## 2019-07-09 NOTE — PROGRESS NOTE ADULT - ASSESSMENT
68 year old white female PMH mild COPD, cholelithiasis ,HLD (not on medications) who presents with  c/o severe RUQ pain, with biliary colic. Pt. is scheduled for a lap mallory in the AM.      Cholecystitis  - s/p Lap mallory with BRITTANEY, tolerated procedure well with no obvious cardiac complications  - EKG noted, with RBBB which was present on 7/4.  Patient has Hx of COPD which could be the etiology.  She has no cardiac Hx and has been asymptomatic  - Monitor and replete lytes  - Pain control  - Encourage incentive spirometer  - Encourage ambulation    Stable from cardiac standpoint for discharge    Светлана Eagle [facility]  Cardiology   Spectra #[phone]

## 2019-07-09 NOTE — DISCHARGE NOTE NURSING/CASE MANAGEMENT/SOCIAL WORK - NSDCDPATPORTLINK_GEN_ALL_CORE
You can access the [facility] Patient Portal, offered by [facility], by registering with the following website: [email or web address]

## 2019-07-09 NOTE — PROGRESS NOTE ADULT - SUBJECTIVE AND OBJECTIVE BOX
The patient was interviewed and evaluated.    68y Female    T(C): 36.7 (07-09-19 @ 08:14), Max: 37.1 (07-08-19 @ 19:00)  HR: 69 (07-09-19 @ 08:14) (63 - 99)  BP: 145/91 (07-09-19 @ 08:14) (120/78 - 192/85)  RR: 16 (07-09-19 @ 08:14) (14 - 21)  SpO2: 95% (07-09-19 @ 08:14) (93% - 100%)  Wt(kg): --    No Nausea/vomiting, recall, sore throat or headache.    No anesthesia related complaints or sequelae.

## 2019-07-11 LAB — SURGICAL PATHOLOGY STUDY: SIGNIFICANT CHANGE UP

## 2019-09-12 NOTE — ED ADULT TRIAGE NOTE - ESI TRIAGE ACUITY LEVEL, MLM
Return in about 1 year (around 9/12/2020), or if symptoms worsen or fail to improve.    During the hours of 8:00 am to 5:00 pm Monday through Thursday, please contact us at the [facility] [phone]. You can also contact us anytime through [employer] to make an appointment, with questions for your provider and medication refills.    If it is urgent that you speak with someone outside of these hours, our [facility] Call Center will be able to assist you at [phone].    Thank you for choosing [facility] for your Dermatology care.      Start the Doxycycline 100 mg by mouth twice daily  for 3 Months    Continue Amlactin (ammonium lactate) 12% cream - Apply to affected areas nightly.    Continue to use gentle products and to moisturize daily with a cream or ointment.    Okay to continue OTC CompoundW nightly after 2 weeks from treatment      LIQUID NITROGEN INSTRUCTIONS    Freezing with liquid nitrogen is accompanied by a stinging, burning sensation which usually lasts from 15 minutes up to several hours.     It is normal for a blister to form at the treatment site.  Occasionally this will be a blood blister.  It is usually best to leave the blister unopened.  The blister normally breaks in the first few days, but on the fingers it can last longer.  If it is painful, it can be opened after cleansing the area with soap and water followed by rubbing alcohol.  A needle that has been sterilized with a match and then wiped clean with rubbing alcohol can be used to open the blister.  Wash hands before opening the blister.  The blister may form into a dry crust.  The crust should peel off in 2-4 weeks.  There may be some minimal redness after the crust falls off, but this should fade with time.     It is okay to wash, shampoo, shower or apply cosmetics to the area, but the area should not be rubbed as this can irritate it.  Pat dry with a soft towel.    Apply Vaseline  to the treated area four times a  day and it will heal faster.  Wash hands before applying Vaseline.    Possible side effects: risk of permanent pigment change (lighter or darker skin), persistence, blister, crusting, discomfort, infection.      Please call if any questions:    Dr. Iverson at      3

## 2022-03-17 NOTE — ED ADULT NURSE NOTE - CARDIO ASSESSMENT
"    3/17/2022         RE: Joselyn Leigh  [address] [address]        Dear Colleague,    Thank you for referring your patient, Joselyn Leigh, to the [facility] [place]. Please see a copy of my visit note below.    Joselyn is a 71 year old who is being evaluated via a billable telephone visit.  [phone]    Joselyn is a 71 year old who is being evaluated via a billable telephone visit.      What phone number would you like to be contacted at? [phone]  How would you like to obtain your AVS? By mail    Subjective   Joselyn is a 71 year old who presents for the following health issues: S/P liver transplantation    HPI:  I had the pleasure of seeing Joselyn Leigh for followup in the Liver Transplantation Clinic at the [facility] on March 17, 2022.  Ms. 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  It was also complicated by hepatocellular carcinoma.      For the most part, she is doing fairly well.  She denies any abdominal pain.  She denies any itching or skin rash.  She denies fatigue and is exercising on a daily basis.      She denies any fevers or chills, cough or shortness of breath.  She denies any nausea or vomiting.  Her appetite has been good and her weight has remained relatively stable.      She has received 3 doses of the COVID-19 vaccine.    Objective      Vitals:  No vitals were obtained today due to virtual visit.    Physical Exam: GENERAL APPEARANCE:\"healthy\",\"alert\",\"no distress. PSYCH: Alert and oriented times 3; coherent speech, normal  rate and volume, able to articulate logical thoughts, able  to abstract reason, no tangential thoughts, no hallucinations or delusions  Her affect is appropriate. RESP: No cough, no audible wheezing, able to talk in full sentences. Remainder of exam unable to be completed due to telephone visits.    Her most " recent laboratory tests are from February 10 and show her white count is 4.4, hemoglobin is 14.1 and platelets are 208,000.  Her sodium is 138, potassium 4.7, BUN is 27 and creatinine is 0.83.  Her AST is 19, ALT is 14 and alkaline phosphatase is 92.  Her albumin is 4.3 with a total protein of 7.8 and total bilirubin is 0.6.  Her tacrolimus level is 3.3.    My impression is that Ms. Leigh is almost 6 years status post liver transplantation and is really doing quite well.  She does need a follow-up DEXA scan and we also recommended that her transplant patients get a fourth dose of the COVID-19 vaccine.  She is also due for a second Pneumovax vaccine.  She is otherwise up-to-date with regard to other vaccines and cancer screening.  She does need to see us once a year in order for us to continue to renew her medications.     Thank you very much for allowing me to participate in the care of this patient.  If you have any questions regarding my recommendations, please do not hesitate to contact me.         Chuck Mata MD      Professor of Medicine  [employer] [employer]      Executive Medical Director, Solid Organ Transplant Program  [facility]     Phone call duration: 20 minutes with 10 minutes for documentation.       WDL
